# Patient Record
Sex: FEMALE | Race: WHITE | NOT HISPANIC OR LATINO | Employment: STUDENT
[De-identification: names, ages, dates, MRNs, and addresses within clinical notes are randomized per-mention and may not be internally consistent; named-entity substitution may affect disease eponyms.]

---
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
## 2022-01-18 VITALS — HEART RATE: 92 BPM | DIASTOLIC BLOOD PRESSURE: 56 MMHG | SYSTOLIC BLOOD PRESSURE: 94 MMHG

## 2022-01-18 VITALS
SYSTOLIC BLOOD PRESSURE: 96 MMHG | OXYGEN SATURATION: 98 % | HEART RATE: 95 BPM | TEMPERATURE: 97.5 F | DIASTOLIC BLOOD PRESSURE: 60 MMHG

## 2022-01-18 ASSESSMENT — PATIENT HEALTH QUESTIONNAIRE - PHQ9: SUM OF ALL RESPONSES TO PHQ QUESTIONS 1-9: 13

## 2022-03-28 ENCOUNTER — VIRTUAL VISIT (OUTPATIENT)
Dept: INTERNAL MEDICINE | Facility: CLINIC | Age: 22
End: 2022-03-28
Payer: COMMERCIAL

## 2022-03-28 DIAGNOSIS — G82.50 QUADRIPLEGIA (H): ICD-10-CM

## 2022-03-28 DIAGNOSIS — F32.1 MAJOR DEPRESSIVE DISORDER, SINGLE EPISODE, MODERATE (H): Primary | ICD-10-CM

## 2022-03-28 DIAGNOSIS — F41.1 GENERALIZED ANXIETY DISORDER: ICD-10-CM

## 2022-03-28 PROCEDURE — 99214 OFFICE O/P EST MOD 30 MIN: CPT | Mod: GT | Performed by: NURSE PRACTITIONER

## 2022-03-28 RX ORDER — RISDIPLAM 0.75 MG/ML
POWDER, FOR SOLUTION ORAL DAILY
COMMUNITY

## 2022-03-28 ASSESSMENT — ANXIETY QUESTIONNAIRES
6. BECOMING EASILY ANNOYED OR IRRITABLE: MORE THAN HALF THE DAYS
IF YOU CHECKED OFF ANY PROBLEMS ON THIS QUESTIONNAIRE, HOW DIFFICULT HAVE THESE PROBLEMS MADE IT FOR YOU TO DO YOUR WORK, TAKE CARE OF THINGS AT HOME, OR GET ALONG WITH OTHER PEOPLE: SOMEWHAT DIFFICULT
GAD7 TOTAL SCORE: 7
5. BEING SO RESTLESS THAT IT IS HARD TO SIT STILL: NOT AT ALL
7. FEELING AFRAID AS IF SOMETHING AWFUL MIGHT HAPPEN: SEVERAL DAYS
3. WORRYING TOO MUCH ABOUT DIFFERENT THINGS: SEVERAL DAYS
1. FEELING NERVOUS, ANXIOUS, OR ON EDGE: SEVERAL DAYS
2. NOT BEING ABLE TO STOP OR CONTROL WORRYING: SEVERAL DAYS

## 2022-03-28 ASSESSMENT — PATIENT HEALTH QUESTIONNAIRE - PHQ9
SUM OF ALL RESPONSES TO PHQ QUESTIONS 1-9: 5
5. POOR APPETITE OR OVEREATING: SEVERAL DAYS

## 2022-03-28 NOTE — PROGRESS NOTES
Anna is a 21 year old who is being evaluated via a billable video visit.      How would you like to obtain your AVS? Mail a copy  If the video visit is dropped, the invitation should be resent by: Text to cell phone: [phone]  Will anyone else be joining your video visit? No    Video Start Time: 2:34 PM    Assessment & Plan   Problem List Items Addressed This Visit        Nervous and Auditory    Quadriplegia (H)     Doing well with power wheelchair             Behavioral    Generalized anxiety disorder     Reports increased symptoms  INCREASE fluoxetine to 20 mg daily  Follow up in 6 weeks          Relevant Medications    FLUoxetine (PROZAC) 20 MG capsule    Major depressive disorder, single episode, moderate (H) - Primary    Relevant Medications    FLUoxetine (PROZAC) 20 MG capsule             Return in about 6 weeks (around 5/9/2022) for Follow up, using a video visit.    Nalini Navarrete NP  [facility]   Anna is a 21 year old who presents for the following health issues: anxiety.    HPI   She asks to increase her dose of fluoxetine. She finds herself worrying and she is more irritable and notices that she has more of a temper. No changes in life circumstances. She is not currently seeing a psychologist. She is getting a normal amount of sleep.     She is not sexually active, declines STI screening.     PHQ 3/17/2021 3/28/2022   PHQ-9 Total Score 13 5   Q9: Thoughts of better off dead/self-harm past 2 weeks Not at all Not at all     CAR-7 SCORE 3/17/2021 3/28/2022   Total Score 20 7         Review of Systems   Constitutional, HEENT, cardiovascular, pulmonary, GI, , musculoskeletal, neuro, skin, endocrine and psych systems are negative, except as otherwise noted.        Objective    Vitals - Patient Reported  Weight (Patient Reported): 23.6 kg (52 lb)  Height (Patient Reported): 152.4 cm (5')  BMI (Based on Pt Reported Ht/Wt): 10.16        Physical Exam   GENERAL:  Healthy, alert and no distress  EYES: Eyes grossly normal to inspection.  No discharge or erythema, or obvious scleral/conjunctival abnormalities.  RESP: No audible wheeze, cough, or visible cyanosis.  No visible retractions or increased work of breathing.    SKIN: Visible skin clear. No significant rash, abnormal pigmentation or lesions.  NEURO: Cranial nerves grossly intact.  Mentation and speech appropriate for age.  PSYCH: Mentation appears normal, affect normal/bright, judgement and insight intact, normal speech and appearance well-groomed.            Video-Visit Details    Type of service:  Video Visit    Video End Time:2:41 PM    Originating Location (pt. Location): Home    Distant Location (provider location):  [facility]     Platform used for Video Visit: SUPENTA

## 2022-03-29 ASSESSMENT — ANXIETY QUESTIONNAIRES: GAD7 TOTAL SCORE: 7

## 2022-05-09 ENCOUNTER — VIRTUAL VISIT (OUTPATIENT)
Dept: INTERNAL MEDICINE | Facility: CLINIC | Age: 22
End: 2022-05-09
Payer: COMMERCIAL

## 2022-05-09 DIAGNOSIS — Z53.9 ERRONEOUS ENCOUNTER--DISREGARD: Primary | ICD-10-CM

## 2022-05-09 NOTE — PROGRESS NOTES
"Anna is a 22 year old who is being evaluated via a billable video visit.      How would you like to obtain your AVS? Mail a copy  If the video visit is dropped, the invitation should be resent by: {video visit invitation:511238}  Will anyone else be joining your video visit? No  {If patient encounters technical issues they should call [phone] :316704}    Video Start Time: {video visit start/end time for provider to select:640407}    {PROVIDER CHARTING PREFERENCE:667188}    Subjective   Anna is a 22 year old who presents for the following health issues {ACCOMPANIED BY STATEMENT (Optional):674156}    HPI     Medication Followup of  Fluoxetine 20 mg    Taking Medication as prescribed: {.:569158::\"yes\"}    Side Effects:  {NONEORCHOOSE:089896::\"None\"}    Medication Helping Symptoms:  {.:533654::\"yes\"}     {additonal problems for provider to add (Optional):430532}    Review of Systems   {ROS COMP (Optional):767263}      Objective           Vitals:  No vitals were obtained today due to virtual visit.    Physical Exam   {video visit exam brief selected:426141::\"GENERAL: Healthy, alert and no distress\",\"EYES: Eyes grossly normal to inspection.  No discharge or erythema, or obvious scleral/conjunctival abnormalities.\",\"RESP: No audible wheeze, cough, or visible cyanosis.  No visible retractions or increased work of breathing.  \",\"SKIN: Visible skin clear. No significant rash, abnormal pigmentation or lesions.\",\"NEURO: Cranial nerves grossly intact.  Mentation and speech appropriate for age.\",\"PSYCH: Mentation appears normal, affect normal/bright, judgement and insight intact, normal speech and appearance well-groomed.\"}    {Diagnostic Test Results (Optional):722186}    {AMBULATORY ATTESTATION (Optional):222382}        Video-Visit Details    Type of service:  Video Visit    Video End Time:{video visit start/end time for provider to select:831629}    Originating Location (pt. Location): {video visit patient " "location:561473::\"Home\"}    Distant Location (provider location):  [facility]     Platform used for Video Visit: {Virtual Visit Platforms:040269::\"Well\"}  "

## 2022-05-12 ENCOUNTER — TRANSFERRED RECORDS (OUTPATIENT)
Dept: HEALTH INFORMATION MANAGEMENT | Facility: CLINIC | Age: 22
End: 2022-05-12
Payer: COMMERCIAL

## 2022-06-02 ENCOUNTER — APPOINTMENT (OUTPATIENT)
Dept: URBAN - METROPOLITAN AREA CLINIC 256 | Age: 22
Setting detail: DERMATOLOGY
End: 2022-06-03

## 2022-06-02 VITALS — HEIGHT: 60 IN | WEIGHT: 50 LBS

## 2022-06-02 DIAGNOSIS — D22 MELANOCYTIC NEVI: ICD-10-CM

## 2022-06-02 DIAGNOSIS — Z71.89 OTHER SPECIFIED COUNSELING: ICD-10-CM

## 2022-06-02 PROBLEM — D22.72 MELANOCYTIC NEVI OF LEFT LOWER LIMB, INCLUDING HIP: Status: ACTIVE | Noted: 2022-06-02

## 2022-06-02 PROBLEM — D22.5 MELANOCYTIC NEVI OF TRUNK: Status: ACTIVE | Noted: 2022-06-02

## 2022-06-02 PROBLEM — D22.4 MELANOCYTIC NEVI OF SCALP AND NECK: Status: ACTIVE | Noted: 2022-06-02

## 2022-06-02 PROCEDURE — OTHER REASSURANCE: OTHER

## 2022-06-02 PROCEDURE — 99202 OFFICE O/P NEW SF 15 MIN: CPT

## 2022-06-02 PROCEDURE — OTHER MIPS QUALITY: OTHER

## 2022-06-02 PROCEDURE — OTHER COUNSELING: OTHER

## 2022-06-02 ASSESSMENT — LOCATION ZONE DERM
LOCATION ZONE: SCALP
LOCATION ZONE: TRUNK
LOCATION ZONE: FEET

## 2022-06-02 ASSESSMENT — LOCATION DETAILED DESCRIPTION DERM
LOCATION DETAILED: MID-OCCIPITAL SCALP
LOCATION DETAILED: LEFT MEDIAL DORSAL FOOT
LOCATION DETAILED: LEFT LATERAL DORSAL FOOT
LOCATION DETAILED: UMBILICUS

## 2022-06-02 ASSESSMENT — LOCATION SIMPLE DESCRIPTION DERM
LOCATION SIMPLE: ABDOMEN
LOCATION SIMPLE: LEFT FOOT
LOCATION SIMPLE: POSTERIOR SCALP

## 2022-06-02 NOTE — PROCEDURE: REASSURANCE
Detail Level: Zone
Hide Include Location In Plan Question?: No
The patient is a 5y2m Male complaining of lacerations.

## 2022-06-02 NOTE — HPI: FULL BODY SKIN EXAMINATION
What Is The Reason For Today's Visit?: Full Body Skin Examination
What Is The Reason For Today's Visit? (Being Monitored For X): the development of new lesions
Additional History: Family history of melanoma; Grandfather and great grandfather. \\nFamily history of basal cell carcinoma: two aunts\\nPatient would like several nevi in particular evaluated during skin exam.

## 2022-06-21 DIAGNOSIS — F41.1 GENERALIZED ANXIETY DISORDER: ICD-10-CM

## 2022-06-22 NOTE — TELEPHONE ENCOUNTER
"Last Written Prescription Date:  3/28/22  Last Fill Quantity: 90,  # refills: 0   Last office visit provider:  5/9/22     Requested Prescriptions   Pending Prescriptions Disp Refills     FLUoxetine (PROZAC) 20 MG capsule [Pharmacy Med Name: FLUOXETINE 20MG CAPSULES] 90 capsule 0     Sig: TAKE 1 CAPSULE(20 MG) BY MOUTH DAILY       SSRIs Protocol Passed - 6/22/2022  7:53 AM        Passed - Recent (12 mo) or future (30 days) visit within the authorizing provider's specialty     Patient has had an office visit with the authorizing provider or a provider within the authorizing providers department within the previous 12 mos or has a future within next 30 days. See \"Patient Info\" tab in inbasket, or \"Choose Columns\" in Meds & Orders section of the refill encounter.              Passed - Medication is active on med list        Passed - Patient is age 18 or older        Passed - No active pregnancy on record        Passed - No positive pregnancy test in last 12 months             Yovany Alcazar RN 06/22/22 7:53 AM  "

## 2022-08-08 ENCOUNTER — TRANSFERRED RECORDS (OUTPATIENT)
Dept: INTERNAL MEDICINE | Facility: CLINIC | Age: 22
End: 2022-08-08

## 2022-08-23 ENCOUNTER — HOSPITAL ENCOUNTER (OUTPATIENT)
Dept: ULTRASOUND IMAGING | Facility: HOSPITAL | Age: 22
Discharge: HOME | End: 2022-08-23
Attending: NURSE PRACTITIONER | Admitting: NURSE PRACTITIONER
Payer: COMMERCIAL

## 2022-08-23 DIAGNOSIS — N92.1 BREAKTHROUGH BLEEDING WITH IUD: ICD-10-CM

## 2022-08-23 DIAGNOSIS — Z97.5 BREAKTHROUGH BLEEDING WITH IUD: ICD-10-CM

## 2022-08-23 PROCEDURE — 76856 US EXAM PELVIC COMPLETE: CPT

## 2022-11-29 ENCOUNTER — TRANSFERRED RECORDS (OUTPATIENT)
Dept: HEALTH INFORMATION MANAGEMENT | Facility: CLINIC | Age: 22
End: 2022-11-29

## 2023-01-03 NOTE — PROGRESS NOTES
Roomed by: Lisbeth Bowen CMA    Accompanied by Mother    Refills needed? No    Do you have any forms that need to be filled out? No        Vitals:    04/21/17 1434   BP: 104/68   Pulse: 104   Temp: 98.1  F (36.7  C)       Chief Complaint   Patient presents with     Nasal Congestion     Since Sunday     Ear Pain     Since Monday       HPI:    Congested   Started on Monday with ST  Tues till now nose and ears feel plugged    ST better          ROS:      Fever: slight on day one    Cough: a little    Wakeful: yes          SH:   no one else ill at home          ================================    Physical Exam:    General Appearance:   Alert, NAD   Eyes: clear    Ears:  Right TM:  clear   Left TM:  clear   Nose: clear    Throat:  clear       Neck:   Supple, No significant adenopathy   Lungs:  clear                Cardiac:   S1, S2 nl      No orders of the defined types were placed in this encounter.       Assessment:    1. Upper respiratory infection        Plan: See Patient Instructions.    No medications were ordered this encounter      Patient Instructions   Reassuring that the lungs are clear and ears are normal.    Plenty of fluids.  Acetaminophen or ibuprofen as needed for fever or pain.  Follow up  if more ill or not getting better.        Inflammation Suggestive Of Cancer Camouflage Histology Text: There was a dense lymphocytic infiltrate which prevented adequate histologic evaluation of adjacent structures.

## 2023-03-14 ENCOUNTER — OFFICE VISIT (OUTPATIENT)
Dept: PEDIATRICS | Facility: CLINIC | Age: 23
End: 2023-03-14
Payer: COMMERCIAL

## 2023-03-14 VITALS
OXYGEN SATURATION: 97 % | DIASTOLIC BLOOD PRESSURE: 67 MMHG | TEMPERATURE: 98.8 F | BODY MASS INDEX: 10.88 KG/M2 | SYSTOLIC BLOOD PRESSURE: 111 MMHG | HEART RATE: 105 BPM | RESPIRATION RATE: 12 BRPM | WEIGHT: 54 LBS | HEIGHT: 59 IN

## 2023-03-14 DIAGNOSIS — R35.0 URINARY FREQUENCY: ICD-10-CM

## 2023-03-14 DIAGNOSIS — G12.9 SPINAL MUSCULAR ATROPHY (H): ICD-10-CM

## 2023-03-14 DIAGNOSIS — N39.0 ACUTE UTI (URINARY TRACT INFECTION): Primary | ICD-10-CM

## 2023-03-14 DIAGNOSIS — G82.50 QUADRIPLEGIA (H): ICD-10-CM

## 2023-03-14 LAB
ALBUMIN UR-MCNC: 30 MG/DL
AMORPH CRY #/AREA URNS HPF: ABNORMAL /HPF
APPEARANCE UR: ABNORMAL
BACTERIA #/AREA URNS HPF: ABNORMAL /HPF
BILIRUB UR QL STRIP: NEGATIVE
COLOR UR AUTO: YELLOW
GLUCOSE UR STRIP-MCNC: NEGATIVE MG/DL
HGB UR QL STRIP: ABNORMAL
KETONES UR STRIP-MCNC: 15 MG/DL
LEUKOCYTE ESTERASE UR QL STRIP: ABNORMAL
NITRATE UR QL: NEGATIVE
PH UR STRIP: 8.5 [PH] (ref 5–8)
RBC #/AREA URNS AUTO: ABNORMAL /HPF
SP GR UR STRIP: 1.02 (ref 1–1.03)
SQUAMOUS #/AREA URNS AUTO: ABNORMAL /LPF
UROBILINOGEN UR STRIP-ACNC: 0.2 E.U./DL
WBC #/AREA URNS AUTO: ABNORMAL /HPF

## 2023-03-14 PROCEDURE — 81001 URINALYSIS AUTO W/SCOPE: CPT | Performed by: STUDENT IN AN ORGANIZED HEALTH CARE EDUCATION/TRAINING PROGRAM

## 2023-03-14 PROCEDURE — 87086 URINE CULTURE/COLONY COUNT: CPT | Performed by: STUDENT IN AN ORGANIZED HEALTH CARE EDUCATION/TRAINING PROGRAM

## 2023-03-14 PROCEDURE — 99204 OFFICE O/P NEW MOD 45 MIN: CPT | Performed by: STUDENT IN AN ORGANIZED HEALTH CARE EDUCATION/TRAINING PROGRAM

## 2023-03-14 RX ORDER — SULFAMETHOXAZOLE/TRIMETHOPRIM 800-160 MG
1 TABLET ORAL 2 TIMES DAILY
Qty: 6 TABLET | Refills: 0 | Status: SHIPPED | OUTPATIENT
Start: 2023-03-14 | End: 2023-03-17

## 2023-03-14 NOTE — PROGRESS NOTES
Assessment & Plan     Acute UTI (urinary tract infection)    - sulfamethoxazole-trimethoprim (BACTRIM DS) 800-160 MG tablet; Take 1 tablet by mouth 2 times daily for 3 days    Urinary frequency    - UA Macro with Reflex to Micro and Culture - lab collect; Future  - UA Macro with Reflex to Micro and Culture - lab collect  - UA Microscopic with Reflex to Culture    Spinal muscular atrophy (H)      Quadriplegia (H)    Anna has evidence of uncomplicated UTI.  This is her first UTI in several years.  She has started a sexual relationship and did have intercourse prior to developing symptoms. Advised on urination post sexual intercourse.  Monitor for recurrence.  Follow up with PCP if starts with recurring UTI as there can be preventative options in place for her to support reducing UTI frequency and sexual health.     Will treat with Bactrim DS as above. Can continue with pyridium today as she bought over the counter.     No signs of pyelonephritis.  Reviewed s/s of pyelonephritis including back pain, nausea/ vomiting, fever chills.  If she has those symptoms along with urinary symptoms I advised her to be seen in the ER for treatment.     I will get a urine culture in case Anna has an unanticipated bacteria causing her symptoms.                    No follow-ups on file.    Cristy MACIEL MD  [facility]   Anna is a 22 year old, presenting for the following health issues:  UTI (Started Fri/Sat (3/10, 3/11)/Urinary frequency./Some pain w/urination./No visible hematuria.)      HPI     Genitourinary - Female  Onset/Duration: Fri/Sat (3/10, 3/11)  Description:   Painful urination (Dysuria): YES, burning and stinging pain- before after urination           Frequency: YES- noted overnight. Has a PCA that lives in her building, had to call PCA more frequently last night for assistance in bathroom.   Blood in urine (Hematuria): No  Delay in urine (Hesitency):  "No  Progression of Symptoms:  worsening  Accompanying Signs & Symptoms:  Fever/chills: No  Flank pain: No  Nausea and vomiting: No  Vaginal symptoms: none  Abdominal/Pelvic Pain: YES- some lower cramping  History:   History of frequent UTI s: YES- in middle school  History of kidney stones: No  Sexually Active: YES; last epsidoe on 3/11 or 3/12.   Possibility of pregnancy: No  Precipitating or alleviating factors: drinking fluids does help  Therapies tried and outcome: Pyridium - over the counter     Has history of UTI in the past- occurred in middle school    Patient Active Problem List   Diagnosis     Spinal muscular atrophy (H)     Neuromuscular scoliosis     Vitamin D Deficiency     Generalized anxiety disorder     Ineffective airway clearance     Quadriplegia (H)     Wheelchair dependence     Wears eyeglasses     Restrictive lung disease     Major depressive disorder, single episode, moderate (H)             Review of Systems   Constitutional, HEENT, cardiovascular, pulmonary, gi and gu systems are negative, except as otherwise noted.      Objective    /67   Pulse 105   Temp 98.8  F (37.1  C) (Oral)   Resp 12   Ht 4' 11\" (1.499 m)   Wt (!) 54 lb (24.5 kg)   SpO2 97%   BMI 10.91 kg/m    Body mass index is 10.91 kg/m .  Physical Exam   GENERAL: healthy, alert and no distress  RESP: lungs clear to auscultation - no rales, rhonchi or wheezes  CV: regular rate and rhythm, normal S1 S2, no murmur  BACK: no CVA tenderness, no paralumbar tenderness. Scoliosis present.     Results for orders placed or performed in visit on 03/14/23 (from the past 24 hour(s))   UA Macro with Reflex to Micro and Culture - lab collect    Specimen: Urine, Midstream   Result Value Ref Range    Color Urine Yellow Colorless, Straw, Light Yellow, Yellow    Appearance Urine Cloudy (A) Clear    Glucose Urine Negative Negative mg/dL    Bilirubin Urine Negative Negative    Ketones Urine 15 (A) Negative mg/dL    Specific Gravity Urine " 1.020 1.005 - 1.030    Blood Urine Moderate (A) Negative    pH Urine 8.5 (H) 5.0 - 8.0    Protein Albumin Urine 30 (A) Negative mg/dL    Urobilinogen Urine 0.2 0.2, 1.0 E.U./dL    Nitrite Urine Negative Negative    Leukocyte Esterase Urine Small (A) Negative   UA Microscopic with Reflex to Culture   Result Value Ref Range    Bacteria Urine Moderate (A) None Seen /HPF    RBC Urine 0-2 0-2 /HPF /HPF    WBC Urine 0-5 0-5 /HPF /HPF    Squamous Epithelials Urine Moderate (A) None Seen /LPF    Amorphous Crystals Urine Moderate (A) None Seen /HPF    Narrative    Urine Culture not indicated

## 2023-03-14 NOTE — PATIENT INSTRUCTIONS
Urinary Tract Infections in Women  Urinary tract infections (UTIs) are most often caused by bacteria. These bacteria enter the urinary tract. The bacteria may come from inside the body. Or they may travel from the skin outside the rectum or vagina into the urethra. Female anatomy makes it easy for bacteria from the bowel to enter a woman s urinary tract, which is the most common source of UTI. This means women develop UTIs more often than men. Pain in or around the urinary tract is a common UTI symptom. But the only way to know for sure if you have a UTI for the healthcare provider to test your urine. The two tests that may be done are the urinalysis and urine culture.     Types of UTIs    Cystitis. A bladder infection (cystitis) is the most common UTI in women. You may have urgent or frequent need to pee. You may also have pain, burning when you pee, and bloody urine.    Urethritis. This is an inflamed urethra, which is the tube that carries urine from the bladder to outside the body. You may have lower stomach or back pain. You may also have urgent or frequent need to pee.    Pyelonephritis. This is a kidney infection. If not treated, it can be serious and damage your kidneys. In severe cases, you may need to stay in the hospital. You may have a fever and lower back pain.    Medicines to treat a UTI  Most UTIs are treated with antibiotics. These kill the bacteria. The length of time you need to take them depends on the type of infection. It may be as short as 3 days. If you have repeated UTIs, you may need a low-dose antibiotic for several months. Take antibiotics exactly as directed. Don t stop taking them until all of the medicine is gone. If you stop taking the antibiotic too soon, the infection may not go away. You may also develop a resistance to the antibiotic. This can make it much harder to treat.   Lifestyle changes to treat and prevent UTIs   The lifestyle changes below will help get rid of your UTI.  They may also help prevent future UTIs.     Drink plenty of fluids. This includes water, juice, or other caffeine-free drinks. Fluids help flush bacteria out of your body.    Empty your bladder. Always empty your bladder when you feel the urge to pee. And always pee before going to sleep. Urine that stays in your bladder can lead to infection. Try to pee before and after sex as well.    Practice good personal hygiene. Wipe yourself from front to back after using the toilet. This helps keep bacteria from getting into the urethra.    Use condoms during sex. These help prevent UTIs caused by sexually transmitted bacteria. Also don't use spermicides during sex. These can increase the risk for UTIs. Choose other forms of birth control instead. For women who tend to get UTIs after sex, a low-dose of a preventive antibiotic may be used. Be sure to discuss this option with your healthcare provider.    Follow up with your healthcare provider as directed. He or she may test to make sure the infection has cleared. If needed, more treatment may be started.  [employer] last reviewed this educational content on 7/1/2019 2000-2021 [employer]. All rights reserved. This information is not intended as a substitute for professional medical care. Always follow your healthcare professional's instructions.          Understanding Urinary Tract Infections (UTIs)   Most UTIs are caused by bacteria, but they may also be caused by viruses or fungi. Bacteria from the bowel are the most common source of infection. The infection may start because of any of the following:     Sexual activity.  During sex, bacteria can travel from the penis, vagina, or rectum into the urethra.     Bacteria outside the rectum getting into the urethra.  Bacteria on the skin outside the rectum may travel into the urethra. This is more common in women since the rectum and urethra are closer to each other than in men. Wiping from front to back after  using the toilet and keeping the area clean can help prevent germs from getting to the urethra.    Blocked urine flow through the urinary tract. If urine sits too long, germs may start to grow out of control.  Parts of the urinary tract  The infection can occur in any part of the urinary tract.       The kidneys. These organs collect and store urine.    The ureters. These tubes carry urine from the kidneys to the bladder.    The bladder. This holds urine until you are ready to let it out.    The urethra. This tube carries urine from the bladder out of the body. It is shorter in women, so bacteria can move through it more easily. The urethra is longer in men, so a UTI is less likely to reach the bladder or kidneys in men.  [employer] last reviewed this educational content on 1/1/2020 2000-2021 [employer]. All rights reserved. This information is not intended as a substitute for professional medical care. Always follow your healthcare professional's instructions.

## 2023-03-16 LAB — BACTERIA UR CULT: NO GROWTH

## 2023-07-21 ENCOUNTER — TELEPHONE (OUTPATIENT)
Dept: INTERNAL MEDICINE | Facility: CLINIC | Age: 23
End: 2023-07-21
Payer: COMMERCIAL

## 2023-07-21 DIAGNOSIS — F41.1 GENERALIZED ANXIETY DISORDER: ICD-10-CM

## 2023-07-21 NOTE — TELEPHONE ENCOUNTER
Please call patient -    ______________________________________________________________________     Home phone:  [phone] (home)     Cell phone:   Telephone Information:   Mobile [phone]       Other contacts:  Name Home Phone Work Phone Mobile Phone Relationship Lgl JoelMYRTLE Delvalle   [phone] Mother    KALEIGH BROWN   [phone] Father      ______________________________________________________________________     Overdue to see Nalini Navarrete.  Its been over a year.    Please schedule a visit with Nalini.  Preferably a face-to-face visit.    We did a 30-day supply of her fluoxetine for the time being.    Troy Robles MD  [facility]  7/21/2023, 12:46 PM

## 2023-07-21 NOTE — TELEPHONE ENCOUNTER
"Routing refill request to provider for review/approval because:  Patient needs to be seen because it has been more than 1 year since last office visit.    Last Written Prescription Date:  6/22/22  Last Fill Quantity: 90,  # refills: 3   Last office visit provider: 3/28/22, PCP    Requested Prescriptions   Pending Prescriptions Disp Refills     FLUoxetine (PROZAC) 20 MG capsule [Pharmacy Med Name: FLUOXETINE 20MG CAPSULES] 90 capsule 3     Sig: TAKE 1 CAPSULE(20 MG) BY MOUTH DAILY       SSRIs Protocol Failed - 7/21/2023 10:41 AM        Failed - Recent (12 mo) or future (30 days) visit within the authorizing provider's specialty     Patient has had an office visit with the authorizing provider or a provider within the authorizing providers department within the previous 12 mos or has a future within next 30 days. See \"Patient Info\" tab in inbasket, or \"Choose Columns\" in Meds & Orders section of the refill encounter.              Passed - Medication is active on med list        Passed - Patient is age 18 or older        Passed - No active pregnancy on record        Passed - No positive pregnancy test in last 12 months             Monse Daniels RN 07/21/23 12:28 PM  "

## 2023-08-31 ENCOUNTER — OFFICE VISIT (OUTPATIENT)
Dept: INTERNAL MEDICINE | Facility: CLINIC | Age: 23
End: 2023-08-31
Payer: COMMERCIAL

## 2023-08-31 VITALS
BODY MASS INDEX: 11.19 KG/M2 | OXYGEN SATURATION: 100 % | SYSTOLIC BLOOD PRESSURE: 95 MMHG | DIASTOLIC BLOOD PRESSURE: 61 MMHG | HEIGHT: 60 IN | WEIGHT: 57 LBS | TEMPERATURE: 97.5 F | HEART RATE: 118 BPM | RESPIRATION RATE: 22 BRPM

## 2023-08-31 DIAGNOSIS — R06.89 INEFFECTIVE AIRWAY CLEARANCE: ICD-10-CM

## 2023-08-31 DIAGNOSIS — G12.9 SPINAL MUSCULAR ATROPHY (H): ICD-10-CM

## 2023-08-31 DIAGNOSIS — Z00.00 ROUTINE GENERAL MEDICAL EXAMINATION AT A HEALTH CARE FACILITY: ICD-10-CM

## 2023-08-31 DIAGNOSIS — G82.50 QUADRIPLEGIA (H): ICD-10-CM

## 2023-08-31 DIAGNOSIS — F32.1 MAJOR DEPRESSIVE DISORDER, SINGLE EPISODE, MODERATE (H): ICD-10-CM

## 2023-08-31 DIAGNOSIS — F41.1 GENERALIZED ANXIETY DISORDER: Primary | ICD-10-CM

## 2023-08-31 PROCEDURE — 99395 PREV VISIT EST AGE 18-39: CPT | Performed by: NURSE PRACTITIONER

## 2023-08-31 PROCEDURE — 99213 OFFICE O/P EST LOW 20 MIN: CPT | Mod: 25 | Performed by: NURSE PRACTITIONER

## 2023-08-31 ASSESSMENT — ENCOUNTER SYMPTOMS
BREAST MASS: 0
HEADACHES: 0
COUGH: 0
MYALGIAS: 0
FREQUENCY: 0
DYSURIA: 0
HEMATOCHEZIA: 0
NERVOUS/ANXIOUS: 0
HEARTBURN: 0
SORE THROAT: 0
SHORTNESS OF BREATH: 0
JOINT SWELLING: 0
PARESTHESIAS: 0
NAUSEA: 0
HEMATURIA: 0
EYE PAIN: 0
CONSTIPATION: 0
FEVER: 0
ABDOMINAL PAIN: 0
PALPITATIONS: 0
CHILLS: 0
DIARRHEA: 0
ARTHRALGIAS: 0
WEAKNESS: 0
DIZZINESS: 0

## 2023-08-31 ASSESSMENT — ANXIETY QUESTIONNAIRES
7. FEELING AFRAID AS IF SOMETHING AWFUL MIGHT HAPPEN: NOT AT ALL
GAD7 TOTAL SCORE: 0
5. BEING SO RESTLESS THAT IT IS HARD TO SIT STILL: NOT AT ALL
2. NOT BEING ABLE TO STOP OR CONTROL WORRYING: NOT AT ALL
GAD7 TOTAL SCORE: 0
6. BECOMING EASILY ANNOYED OR IRRITABLE: NOT AT ALL
4. TROUBLE RELAXING: NOT AT ALL
1. FEELING NERVOUS, ANXIOUS, OR ON EDGE: NOT AT ALL
8. IF YOU CHECKED OFF ANY PROBLEMS, HOW DIFFICULT HAVE THESE MADE IT FOR YOU TO DO YOUR WORK, TAKE CARE OF THINGS AT HOME, OR GET ALONG WITH OTHER PEOPLE?: NOT DIFFICULT AT ALL
3. WORRYING TOO MUCH ABOUT DIFFERENT THINGS: NOT AT ALL
IF YOU CHECKED OFF ANY PROBLEMS ON THIS QUESTIONNAIRE, HOW DIFFICULT HAVE THESE PROBLEMS MADE IT FOR YOU TO DO YOUR WORK, TAKE CARE OF THINGS AT HOME, OR GET ALONG WITH OTHER PEOPLE: NOT DIFFICULT AT ALL
7. FEELING AFRAID AS IF SOMETHING AWFUL MIGHT HAPPEN: NOT AT ALL
GAD7 TOTAL SCORE: 0

## 2023-08-31 ASSESSMENT — PATIENT HEALTH QUESTIONNAIRE - PHQ9
10. IF YOU CHECKED OFF ANY PROBLEMS, HOW DIFFICULT HAVE THESE PROBLEMS MADE IT FOR YOU TO DO YOUR WORK, TAKE CARE OF THINGS AT HOME, OR GET ALONG WITH OTHER PEOPLE: NOT DIFFICULT AT ALL
SUM OF ALL RESPONSES TO PHQ QUESTIONS 1-9: 0
SUM OF ALL RESPONSES TO PHQ QUESTIONS 1-9: 0

## 2023-08-31 NOTE — PROGRESS NOTES
SUBJECTIVE:   CC: Anna is an 23 year old who presents for preventive health visit.       8/31/2023     1:21 PM   Additional Questions   Roomed by tommy   Accompanied by self     She is currently on summer break from college.  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  She is interested in doing research in the future.    She is sexually active.  She has a Mirena IUD for contraception.    Healthy Habits:     Getting at least 3 servings of Calcium per day:  Yes    Bi-annual eye exam:  Yes    Dental care twice a year:  Yes    Sleep apnea or symptoms of sleep apnea:  None    Diet:  Regular (no restrictions)    Frequency of exercise:  None    Taking medications regularly:  Yes    Medication side effects:  None    Additional concerns today:  No      Today's PHQ-9 Score:       8/31/2023     1:09 PM   PHQ-9 SCORE   PHQ-9 Total Score MyChart 0   PHQ-9 Total Score 0     CAR-7 (Submitted on 8/31/2023)  CAR 7 TOTAL SCORE: 0    Social History     Tobacco Use    Smoking status: Never    Smokeless tobacco: Never   Substance Use Topics    Alcohol use: Yes     Comment: socially           8/31/2023     1:11 PM   Alcohol Use   Prescreen: >3 drinks/day or >7 drinks/week? No     Reviewed orders with patient.  Reviewed health maintenance and updated orders accordingly - Yes      Breast Cancer Screening:       Reviewed and updated as needed this visit by clinical staff   Tobacco  Allergies  Meds  Problems  Med Hx  Surg Hx  Fam Hx          Reviewed and updated as needed this visit by Provider   Tobacco  Allergies  Meds  Problems  Med Hx  Surg Hx  Fam Hx             Review of Systems   Constitutional:  Negative for chills and fever.   HENT:  Negative for congestion, ear pain, hearing loss and sore throat.    Eyes:  Negative for pain and visual disturbance.   Respiratory:  Negative for cough and shortness of breath.    Cardiovascular:  Negative for chest pain, palpitations and peripheral edema.    Gastrointestinal:  Negative for abdominal pain, constipation, diarrhea, heartburn, hematochezia and nausea.   Breasts:  Negative for tenderness, breast mass and discharge.   Genitourinary:  Negative for dysuria, frequency, genital sores, hematuria, pelvic pain, urgency, vaginal bleeding and vaginal discharge.   Musculoskeletal:  Negative for arthralgias, joint swelling and myalgias.   Skin:  Negative for rash.   Neurological:  Negative for dizziness, weakness, headaches and paresthesias.   Psychiatric/Behavioral:  Negative for mood changes. The patient is not nervous/anxious.           OBJECTIVE:   BP 95/61 (BP Location: Left arm, Patient Position: Sitting, Cuff Size: Adult Regular)   Pulse 118   Temp 97.5  F (36.4  C) (Temporal)   Resp 22   Ht 1.524 m (5')   Wt (!) 25.9 kg (57 lb)   LMP 08/20/2023   SpO2 100%   BMI 11.13 kg/m    Physical Exam  GENERAL: healthy, alert and no distress  RESP: lungs clear to auscultation - no rales, rhonchi or wheezes  CV: regular rate and rhythm, normal S1 S2, no S3 or S4, no murmur, click or rub, no peripheral edema and peripheral pulses strong  ABDOMEN: soft, nontender  MS: quadriplegia. Lateral curvature of spine   NEURO: Normal strength and tone, mentation intact and speech normal          ASSESSMENT/PLAN:     Problem List Items Addressed This Visit          Nervous and Auditory    Spinal muscular atrophy (H)     She continues to follow with neurologist through [facility]         Quadriplegia (H)     She is reliant on a power wheelchair for in home and community mobility            Respiratory    Ineffective airway clearance     Followed by pulmonology            Behavioral    Generalized anxiety disorder - Primary     Continue fluoxetine 20 mg daily which has been more effective than 10 mg         Relevant Medications    FLUoxetine (PROZAC) 20 MG capsule    Major depressive disorder, single episode, moderate (H)     Mood is currently doing well with fluoxetine  20 mg daily         Relevant Medications    FLUoxetine (PROZAC) 20 MG capsule     Other Visit Diagnoses       Routine general medical examination at a health care facility              - She is due for screening Pap smear for cervical cancer screening.  She declines exam today but we will plan to schedule for a later date  - Due for tdap vaccine but this was not given prior to her departure. Will give at next office visit     COUNSELING:  Reviewed preventive health counseling, as reflected in patient instructions        She reports that she has never smoked. She has never used smokeless tobacco.      Nalini Navarrete NP  [facility]

## 2023-08-31 NOTE — PROGRESS NOTES
SUBJECTIVE:   Anna is a 23 year old who presents for Preventive Visit.      8/31/2023     1:21 PM   Additional Questions   Roomed by tommy   Accompanied by self       Are you in the first 12 months of your Medicare coverage?  No    Healthy Habits:     Getting at least 3 servings of Calcium per day:  Yes    Bi-annual eye exam:  Yes    Dental care twice a year:  Yes    Sleep apnea or symptoms of sleep apnea:  None    Diet:  Regular (no restrictions)    Frequency of exercise:  None    Taking medications regularly:  Yes    Medication side effects:  None    Additional concerns today:  No        Have you ever done Advance Care Planning? (For example, a Health Directive, POLST, or a discussion with a medical provider or your loved ones about your wishes): No, advance care planning information given to patient to review.  Patient declined advance care planning discussion at this time.    {Hearing Test Done (Optional):568977}   Fall risk     click delete button to remove this line now  Cognitive Screening Not appropriate due to mental handicap    Do you have sleep apnea, excessive snoring or daytime drowsiness? : no    Reviewed and updated as needed this visit by clinical staff    Allergies  Meds              Reviewed and updated as needed this visit by Provider                 Social History     Tobacco Use    Smoking status: Never    Smokeless tobacco: Never   Substance Use Topics    Alcohol use: Not on file     {Rooming staff  Click this link to complete the Prescreen if response below is not for today's visit  Alcohol Use Prescreen >3 drinks/day or > 7 drinks/week.  If the prescreen question answer is YES, complete the full AUDIT  :908710}        8/31/2023     1:11 PM   Alcohol Use   Prescreen: >3 drinks/day or >7 drinks/week? No          No data to display              Do you have a current opioid prescription? No  Do you use any other controlled substances or medications that are not prescribed by a provider?  None  {Provider  If there are gaps in the social history shown above, please follow the link and refresh the note Link to Social and Substance History :103493}    {Outside tests to abstract? :167155}    {additional problems to add (Optional):438357}    Current providers sharing in care for this patient include:   Patient Care Team:  Nalini Navarrete NP as PCP - General (Orthothist)  Nalini Navarrete NP as Assigned PCP    The following health maintenance items are reviewed in Epic and correct as of today:  Health Maintenance   Topic Date Due    ANNUAL REVIEW OF HM ORDERS  Never done    ADVANCE CARE PLANNING  Never done    DEPRESSION ACTION PLAN  Never done    YEARLY PREVENTIVE VISIT  06/13/2017    PAP  Never done    DTAP/TDAP/TD IMMUNIZATION (7 - Td or Tdap) 05/23/2023    INFLUENZA VACCINE (1) 09/01/2023    PHQ-9  02/29/2024    IPV IMMUNIZATION  Completed    HPV IMMUNIZATION  Completed    HEPATITIS B IMMUNIZATION  Completed    COVID-19 Vaccine  Completed    Pneumococcal Vaccine: Pediatrics (0 to 5 Years) and At-Risk Patients (6 to 64 Years)  Aged Out    MENINGITIS IMMUNIZATION  Aged Out    HEPATITIS C SCREENING  Discontinued    HIV SCREENING  Discontinued    CHLAMYDIA SCREENING  Discontinued     {Chronicprobdata (optional):670976}  {Decision Support (Optional):905454}        Review of Systems   Constitutional:  Negative for chills and fever.   HENT:  Negative for congestion, ear pain, hearing loss and sore throat.    Eyes:  Negative for pain and visual disturbance.   Respiratory:  Negative for cough and shortness of breath.    Cardiovascular:  Negative for chest pain, palpitations and peripheral edema.   Gastrointestinal:  Negative for abdominal pain, constipation, diarrhea, heartburn, hematochezia and nausea.   Breasts:  Negative for tenderness, breast mass and discharge.   Genitourinary:  Negative for dysuria, frequency, genital sores, hematuria, pelvic pain, urgency, vaginal bleeding and vaginal discharge.  "  Musculoskeletal:  Negative for arthralgias, joint swelling and myalgias.   Skin:  Negative for rash.   Neurological:  Negative for dizziness, weakness, headaches and paresthesias.   Psychiatric/Behavioral:  Negative for mood changes. The patient is not nervous/anxious.      {ROS COMP (Optional):432914}    OBJECTIVE:   LMP 2023  Estimated body mass index is 10.91 kg/m  as calculated from the following:    Height as of 3/14/23: 1.499 m (4' 11\").    Weight as of 3/14/23: 24.5 kg (54 lb).  Physical Exam  {Exam (Optional) :692276}    {Diagnostic Test Results (Optional):991467}    ASSESSMENT / PLAN:   {Diag Picklist:145538}    {Patient advised of split billing (Optional):563833}      COUNSELING:  {Medicare Counselin}        She reports that she has never smoked. She has never used smokeless tobacco.      Appropriate preventive services were discussed with this patient, including applicable screening as appropriate for cardiovascular disease, diabetes, osteopenia/osteoporosis, and glaucoma.  As appropriate for age/gender, discussed screening for colorectal cancer, prostate cancer, breast cancer, and cervical cancer. Checklist reviewing preventive services available has been given to the patient.    Reviewed patients plan of care and provided an AVS. The {CarePlan:206820} for Anna meets the Care Plan requirement. This Care Plan has been established and reviewed with the {PATIENT, FAMILY MEMBER, CAREGIVER:725441}.    {Counseling Resources  [place] Preventive Services Task Force  Cholesterol Screening  Health diet/nutrition  Pooled Cohorts Equation Calculator  [place]DA's MyPlate  ASA Prophylaxis  Lung CA Screening  Osteoporosis prevention/bone health :119490}  {Breast Cancer Risk Calculator  BRCA-Related Cancer Risk Assessment FHS-7 Tool :008663}    Nalini Navarrete NP  [facility]    Identified Health Risks:  {Medicare required documentation of substance and opioid use disorders screening " :313419}Answers submitted by the patient for this visit:  Patient Health Questionnaire (Submitted on 8/31/2023)  If you checked off any problems, how difficult have these problems made it for you to do your work, take care of things at home, or get along with other people?: Not difficult at all  PHQ9 TOTAL SCORE: 0  CAR-7 (Submitted on 8/31/2023)  CAR 7 TOTAL SCORE: 0

## 2023-10-30 ENCOUNTER — VIRTUAL VISIT (OUTPATIENT)
Dept: INTERNAL MEDICINE | Facility: CLINIC | Age: 23
End: 2023-10-30
Payer: COMMERCIAL

## 2023-10-30 DIAGNOSIS — R05.1 ACUTE COUGH: ICD-10-CM

## 2023-10-30 DIAGNOSIS — J98.4 RESTRICTIVE LUNG DISEASE: Primary | ICD-10-CM

## 2023-10-30 PROCEDURE — 99213 OFFICE O/P EST LOW 20 MIN: CPT | Mod: VID | Performed by: NURSE PRACTITIONER

## 2023-10-30 RX ORDER — PREDNISONE 20 MG/1
20 TABLET ORAL DAILY
Qty: 5 TABLET | Refills: 0 | Status: SHIPPED | OUTPATIENT
Start: 2023-10-30 | End: 2023-11-04

## 2023-10-30 NOTE — PROGRESS NOTES
Anna is a 23 year old who is being evaluated via a billable video visit.      Will anyone else be joining your video visit? No      Assessment & Plan   Problem List Items Addressed This Visit       Restrictive lung disease - Primary     Other Visit Diagnoses       Acute cough        Relevant Medications    predniSONE (DELTASONE) 20 MG tablet        - Continue with inhalers including nebulized albuterol regularly  - START: prednisone due to increased wheezing and shortness of breath. Reviewed side effects including immunocompromise and encouraged use of a mask and avoidance of others that are ill  - She incidentally has a follow up visit with her pulmonologist this Thursday   - Continue good airway clearance and do not suppress cough. Continue with guaifenesin and good fluid intake    - If she is getting worse (increase coughing, fever, dyspnea) she should be evaluated ASAP with a face to face evaluation                Nalini Navarrete NP  [facility]   Anna is a 23 year old, presenting for the following health issues:  Cough      History of Present Illness       Reason for visit:  Cough and congestion  Symptom onset:  3-7 days ago  Symptom intensity:  Moderate  Symptom progression:  Improving  Had these symptoms before:  Yes  Has tried/received treatment for these symptoms:  Yes  Previous treatment was successful:  Yes  Prior treatment description:  Prednazone (i dont know how to spell it)    She eats 0-1 servings of fruits and vegetables daily.She consumes 1 sweetened beverage(s) daily.She exercises with enough effort to increase her heart rate 9 or less minutes per day.  She exercises with enough effort to increase her heart rate 3 or less days per week.   She is taking medications regularly.     She has a PMH of neuromuscular scoliosis, ineffective airway clearance, and restrictive lung disease. Last Tuesday (6 days ago) she started to have a sore throat, later that night she  developed aches, rhinitis, and a cough. Now she has a lingering cough and nasal drainage.  She has been taking Mucinex, Emergen-C, Lei's nasal spray. COVID test was negative.  She has had some shortness of breath and is wheezing. No fevers. No facial pain/pressure.     She sees her pulmonologist this Thursday for a regularly scheduled appointment.          Objective           Vitals:  No vitals were obtained today due to virtual visit.    Physical Exam   GENERAL: Alert and no distress  EYES: Eyes grossly normal to inspection.  No discharge or erythema, or obvious scleral/conjunctival abnormalities.  RESP: No audible wheeze, cough, or visible cyanosis.  No visible retractions or increased work of breathing.  Able to speak in full sentences   SKIN: Visible skin clear. No significant rash, abnormal pigmentation or lesions.  NEURO: Cranial nerves grossly intact.  Mentation and speech appropriate for age.  PSYCH: Mentation appears normal, affect normal/bright, judgement and insight intact, normal speech and appearance well-groomed.              Video-Visit Details    Type of service:  Video Visit   Video Start Time:  12:27PM  Video End Time:12:41 PM  Originating Location (pt. Location): Home  Distant Location (provider location):  On-site  Platform used for Video Visit: Doris

## 2023-11-08 ENCOUNTER — TRANSCRIBE ORDERS (OUTPATIENT)
Dept: OTHER | Age: 23
End: 2023-11-08

## 2023-11-08 DIAGNOSIS — G12.9 SPINAL MUSCULAR ATROPHY (H): Primary | ICD-10-CM

## 2024-07-08 DIAGNOSIS — F41.1 GENERALIZED ANXIETY DISORDER: ICD-10-CM

## 2024-08-01 ENCOUNTER — PATIENT OUTREACH (OUTPATIENT)
Dept: CARE COORDINATION | Facility: CLINIC | Age: 24
End: 2024-08-01
Payer: COMMERCIAL

## 2024-08-02 ENCOUNTER — TRANSFERRED RECORDS (OUTPATIENT)
Dept: HEALTH INFORMATION MANAGEMENT | Facility: CLINIC | Age: 24
End: 2024-08-02

## 2024-08-08 ENCOUNTER — MYC MEDICAL ADVICE (OUTPATIENT)
Dept: INTERNAL MEDICINE | Facility: CLINIC | Age: 24
End: 2024-08-08
Payer: COMMERCIAL

## 2024-08-08 NOTE — TELEPHONE ENCOUNTER
Fax received and is at patient's incomplete bin at desk. Patient needs an appointment for completion of this form due to OV notes need to be within the past 6 months that include diagnosis and the need for a power wheelchair.

## 2024-08-15 ENCOUNTER — PATIENT OUTREACH (OUTPATIENT)
Dept: CARE COORDINATION | Facility: CLINIC | Age: 24
End: 2024-08-15
Payer: COMMERCIAL

## 2024-08-18 ENCOUNTER — MYC MEDICAL ADVICE (OUTPATIENT)
Dept: INTERNAL MEDICINE | Facility: CLINIC | Age: 24
End: 2024-08-18
Payer: COMMERCIAL

## 2024-08-22 ASSESSMENT — ANXIETY QUESTIONNAIRES
5. BEING SO RESTLESS THAT IT IS HARD TO SIT STILL: NOT AT ALL
3. WORRYING TOO MUCH ABOUT DIFFERENT THINGS: NOT AT ALL
GAD7 TOTAL SCORE: 0
7. FEELING AFRAID AS IF SOMETHING AWFUL MIGHT HAPPEN: NOT AT ALL
1. FEELING NERVOUS, ANXIOUS, OR ON EDGE: NOT AT ALL
7. FEELING AFRAID AS IF SOMETHING AWFUL MIGHT HAPPEN: NOT AT ALL
2. NOT BEING ABLE TO STOP OR CONTROL WORRYING: NOT AT ALL
GAD7 TOTAL SCORE: 0
GAD7 TOTAL SCORE: 0
6. BECOMING EASILY ANNOYED OR IRRITABLE: NOT AT ALL
4. TROUBLE RELAXING: NOT AT ALL

## 2024-08-27 ENCOUNTER — MEDICAL CORRESPONDENCE (OUTPATIENT)
Dept: HEALTH INFORMATION MANAGEMENT | Facility: CLINIC | Age: 24
End: 2024-08-27

## 2024-08-27 ENCOUNTER — OFFICE VISIT (OUTPATIENT)
Dept: INTERNAL MEDICINE | Facility: CLINIC | Age: 24
End: 2024-08-27
Payer: COMMERCIAL

## 2024-08-27 VITALS
SYSTOLIC BLOOD PRESSURE: 102 MMHG | RESPIRATION RATE: 20 BRPM | BODY MASS INDEX: 12.1 KG/M2 | HEIGHT: 59 IN | DIASTOLIC BLOOD PRESSURE: 62 MMHG | WEIGHT: 60 LBS | HEART RATE: 125 BPM | OXYGEN SATURATION: 99 % | TEMPERATURE: 98.3 F

## 2024-08-27 DIAGNOSIS — F32.1 MAJOR DEPRESSIVE DISORDER, SINGLE EPISODE, MODERATE (H): ICD-10-CM

## 2024-08-27 DIAGNOSIS — F41.1 GENERALIZED ANXIETY DISORDER: ICD-10-CM

## 2024-08-27 DIAGNOSIS — Z11.3 ROUTINE SCREENING FOR STI (SEXUALLY TRANSMITTED INFECTION): ICD-10-CM

## 2024-08-27 DIAGNOSIS — G12.9 SPINAL MUSCULAR ATROPHY (H): ICD-10-CM

## 2024-08-27 DIAGNOSIS — G82.50 QUADRIPLEGIA (H): ICD-10-CM

## 2024-08-27 DIAGNOSIS — Z12.4 CERVICAL CANCER SCREENING: Primary | ICD-10-CM

## 2024-08-27 PROCEDURE — 87491 CHLMYD TRACH DNA AMP PROBE: CPT | Performed by: NURSE PRACTITIONER

## 2024-08-27 PROCEDURE — G0145 SCR C/V CYTO,THINLAYER,RESCR: HCPCS | Performed by: NURSE PRACTITIONER

## 2024-08-27 PROCEDURE — 99214 OFFICE O/P EST MOD 30 MIN: CPT | Performed by: NURSE PRACTITIONER

## 2024-08-27 PROCEDURE — 87591 N.GONORRHOEAE DNA AMP PROB: CPT | Performed by: NURSE PRACTITIONER

## 2024-08-27 PROCEDURE — G2211 COMPLEX E/M VISIT ADD ON: HCPCS | Performed by: NURSE PRACTITIONER

## 2024-08-27 RX ORDER — FAMOTIDINE 20 MG/1
1 TABLET, FILM COATED ORAL
COMMUNITY
Start: 2023-11-02

## 2024-08-27 RX ORDER — CIMETIDINE 300 MG
300 TABLET ORAL PRN
COMMUNITY
Start: 2024-05-30

## 2024-08-27 RX ORDER — AZELASTINE HYDROCHLORIDE 137 UG/1
SPRAY, METERED NASAL
COMMUNITY
Start: 2024-05-29

## 2024-08-27 ASSESSMENT — PATIENT HEALTH QUESTIONNAIRE - PHQ9
SUM OF ALL RESPONSES TO PHQ QUESTIONS 1-9: 0
10. IF YOU CHECKED OFF ANY PROBLEMS, HOW DIFFICULT HAVE THESE PROBLEMS MADE IT FOR YOU TO DO YOUR WORK, TAKE CARE OF THINGS AT HOME, OR GET ALONG WITH OTHER PEOPLE: NOT DIFFICULT AT ALL
SUM OF ALL RESPONSES TO PHQ QUESTIONS 1-9: 0

## 2024-08-27 NOTE — LETTER
My Depression Action Plan  Name: Anna Gu   Date of Birth 2000  Date: 8/27/2024    My doctor: Nalini Navarrete   My clinic: [address] [address]  [phone]            GREEN    ZONE   Good Control    What it looks like:   Things are going generally well. You have normal ups and downs. You may even feel depressed from time to time, but bad moods usually last less than a day.   What you need to do:  Continue to care for yourself (see self care plan)  Check your depression survival kit and update it as needed  Follow your physician s recommendations including any medication.  Do not stop taking medication unless you consult with your physician first.             YELLOW         ZONE Getting Worse    What it looks like:   Depression is starting to interfere with your life.   It may be hard to get out of bed; you may be starting to isolate yourself from others.  Symptoms of depression are starting to last most all day and this has happened for several days.   You may have suicidal thoughts but they are not constant.   What you need to do:     Call your care team. Your response to treatment will improve if you keep your care team informed of your progress. Yellow periods are signs an adjustment may need to be made.     Continue your self-care.  Just get dressed and ready for the day.  Don't give yourself time to talk yourself out of it.    Talk to someone in your support network.    Open up your Depression Self-Care Plan/Wellness Kit.             RED    ZONE Medical Alert - Get Help    What it looks like:   Depression is seriously interfering with your life.   You may experience these or other symptoms: You can t get out of bed most days, can t work or engage in other necessary activities, you have trouble taking care of basic hygiene, or basic responsibilities, thoughts of suicide or death that will not go away, self-injurious  behavior.     What you need to do:  Call your care team and request a same-day appointment. If they are not available (weekends or after hours) call your local crisis line, emergency room or 911.          Depression Self-Care Plan / Wellness Kit    Many people find that medication and therapy are helpful treatments for managing depression. In addition, making small changes to your everyday life can help to boost your mood and improve your wellbeing. Below are some tips for you to consider. Be sure to talk with your medical provider and/or behavioral health consultant if your symptoms are worsening or not improving.     Sleep   Sleep hygiene  means all of the habits that support good, restful sleep. It includes maintaining a consistent bedtime and wake time, using your bedroom only for sleeping or sex, and keeping the bedroom dark and free of distractions like a computer, smartphone, or television.     Develop a Healthy Routine  Maintain good hygiene. Get out of bed in the morning, make your bed, brush your teeth, take a shower, and get dressed. Don t spend too much time viewing media that makes you feel stressed. Find time to relax each day.    Exercise  Get some form of exercise every day. This will help reduce pain and release endorphins, the  feel good  chemicals in your brain. It can be as simple as just going for a walk or doing some gardening, anything that will get you moving.      Diet  Strive to eat healthy foods, including fruits and vegetables. Drink plenty of water. Avoid excessive sugar, caffeine, alcohol, and other mood-altering substances.     Stay Connected with Others  Stay in touch with friends and family members.    Manage Your Mood  Try deep breathing, massage therapy, biofeedback, or meditation. Take part in fun activities when you can. Try to find something to smile about each day.     Psychotherapy  Be open to working with a therapist if your provider recommends it.     Medication  Be sure to  take your medication as prescribed. Most anti-depressants need to be taken every day. It usually takes several weeks for medications to work. Not all medicines work for all people. It is important to follow-up with your provider to make sure you have a treatment plan that is working for you. Do not stop your medication abruptly without first discussing it with your provider.    Crisis Resources   These hotlines are for both adults and children. They and are open 24 hours a day, 7 days a week unless noted otherwise.    National Suicide Prevention Lifeline   988 or 4-431-267-IIBH (2370)    Crisis Text Line    www.crisistextline.org  Text HOME to 983606 from anywhere in the [place], anytime, about any type of crisis. A live, trained crisis counselor will receive the text and respond quickly.    [facility] Lifeline for LGBTQ Youth  A national crisis intervention and suicide lifeline for LGBTQ youth under 25. Provides a safe place to talk without judgement. Call 1-109.394.4176; text START to 437867 or visit www.thetrevorproject.org to talk to a trained counselor.    For [facility] crisis numbers, visit the [facility] website at:  https://[place].gov/dhs/people-we-serve/adults/health-care/mental-health/resources/crisis-contacts.jsp

## 2024-08-27 NOTE — ASSESSMENT & PLAN NOTE
Stable with fluoxetine 20 mg.  She may have misplaced the prescription, and order sent to the pharmacy to be able to dispense this medication early.

## 2024-08-27 NOTE — PROGRESS NOTES
Assessment & Plan   Problem List Items Addressed This Visit       Spinal muscular atrophy (H)     She has spinal muscular atrophy.  She is reliant on a power wheelchair for in-home and community mobility.  She needs some modifications to reduce LE edema and enhance circulation. An order is signed for her to have these parts replaced.         Generalized anxiety disorder     Stable with fluoxetine 20 mg.  She may have misplaced the prescription, and order sent to the pharmacy to be able to dispense this medication early.         Relevant Medications    FLUoxetine (PROZAC) 20 MG capsule    Quadriplegia (H)    Major depressive disorder, single episode, moderate (H)     Mood is currently doing well with fluoxetine 20 mg daily         Relevant Medications    FLUoxetine (PROZAC) 20 MG capsule     Other Visit Diagnoses       Cervical cancer screening    -  Primary    Relevant Orders    Pap Screen Only - Recommended Age 21 - 24 Years    Routine screening for STI (sexually transmitted infection)        Relevant Orders    NEISSERIA GONORRHOEA PCR    CHLAMYDIA TRACHOMATIS PCR          Return in about 6 months (around 2/27/2025) for follow up of anxiety and will be due for a physical .      Isaias Castillo is a 24 year old, presenting for the following health issues:  Gyn Exam, IUD check (Patient states no longer having any symptoms), and Powered Wheelchair Documentation        8/27/2024     1:10 PM   Additional Questions   Roomed by Jose ROSE CMA   Accompanied by Boyfrienbairon Feliciano         8/27/2024   Forms   Any forms needing to be completed Yes        History of Present Illness       Reason for visit:  IUD    She eats 2-3 servings of fruits and vegetables daily.She consumes 1 sweetened beverage(s) daily.She exercises with enough effort to increase her heart rate 9 or less minutes per day.  She exercises with enough effort to increase her heart rate 3 or less days per week.   She is taking medications regularly.     She  "was able to see the IUD strings.  No pain or discomfort.  She has not had the sense that the IUD has moved.    She is due for Pap smear, she has not had a Pap smear in the past.    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  She has quadriplegia and relies on a power wheelchair for in-home and community mobility.  Her feet are generally cold and in a dependent position it is worse. She also experiences swelling in her feet. She was evaluated and it was recommended that she consider the addition of components that will allow her to independently adjust her feet.         Objective    /62 (BP Location: Left arm, Patient Position: Sitting, Cuff Size: Adult Small)   Pulse (!) 125   Temp 98.3  F (36.8  C) (Oral)   Resp 20   Ht 1.499 m (4' 11\")   Wt (!) 27.2 kg (60 lb)   LMP 08/01/2024 (Within Days)   SpO2 99%   BMI 12.12 kg/m    Body mass index is 12.12 kg/m .    Wt Readings from Last 5 Encounters:   08/27/24 (!) 27.2 kg (60 lb)   08/31/23 (!) 25.9 kg (57 lb)   03/14/23 (!) 24.5 kg (54 lb)   07/05/21 (!) 21.8 kg (48 lb) (<1%, Z= -14.74)*   07/05/21 (!) 21.8 kg (48 lb) (<1%, Z= -14.74)*     * Growth percentiles are based on CDC (Girls, 2-20 Years) data.       Physical Exam   GENERAL: alert and no distress  Genital: EXTERNAL GENITALIA: Normal appearing vulva without masses, tenderness or lesions. PERINEUM: normal and intact. URETHRAL MEATUS: normal VAGINA:  vagina with normal color and without discharge or lesions. CERVIX: normal appearing cervix without discharge or lesions. Non-friable. IUD strings visualized and approximate 2 cm in length. Not protruding from the vaginal introitus  MS: quadriplegia. Generalized weakness c/w spinal muscular atrophy.           The longitudinal plan of care for the diagnosis(es)/condition(s) as documented were addressed during this visit. Due to the added complexity in care, I will continue to support Anna in the subsequent management " and with ongoing continuity of care.  Signed Electronically by: Nalini Navarrete NP

## 2024-08-27 NOTE — ASSESSMENT & PLAN NOTE
She has spinal muscular atrophy.  She is reliant on a power wheelchair for in-home and community mobility.  She needs some modifications to reduce LE edema and enhance circulation. An order is signed for her to have these parts replaced.

## 2024-08-28 LAB
C TRACH DNA SPEC QL NAA+PROBE: NEGATIVE
N GONORRHOEA DNA SPEC QL NAA+PROBE: NEGATIVE

## 2024-08-28 NOTE — TELEPHONE ENCOUNTER
Form completed, OV notes attached and faxed out.    Copies made for monthly hold bin and sent to scan.

## 2024-08-30 LAB
BKR LAB AP GYN ADEQUACY: NORMAL
BKR LAB AP GYN INTERPRETATION: NORMAL
BKR LAB AP HPV REFLEX: NO
BKR LAB AP LMP: NORMAL
BKR LAB AP PREVIOUS ABNORMAL: NORMAL
PATH REPORT.COMMENTS IMP SPEC: NORMAL
PATH REPORT.COMMENTS IMP SPEC: NORMAL
PATH REPORT.RELEVANT HX SPEC: NORMAL

## 2024-09-24 NOTE — TELEPHONE ENCOUNTER
Writer re-faxed this. Per patient, they have not received it. Placed in September monthly hold bin

## 2024-09-25 NOTE — TELEPHONE ENCOUNTER
Just received new fax regarding this. Looks like a spot was missed for signature and OV notes need to be signed by PCP.    Form placed on PCP's desk in green folder

## 2024-10-05 ENCOUNTER — HEALTH MAINTENANCE LETTER (OUTPATIENT)
Age: 24
End: 2024-10-05

## 2025-08-27 DIAGNOSIS — F41.1 GENERALIZED ANXIETY DISORDER: ICD-10-CM
